# Patient Record
Sex: FEMALE | Race: BLACK OR AFRICAN AMERICAN | NOT HISPANIC OR LATINO | URBAN - METROPOLITAN AREA
[De-identification: names, ages, dates, MRNs, and addresses within clinical notes are randomized per-mention and may not be internally consistent; named-entity substitution may affect disease eponyms.]

---

## 2020-01-12 ENCOUNTER — EMERGENCY (EMERGENCY)
Age: 20
LOS: 1 days | Discharge: HOME | End: 2020-01-12
Admitting: EMERGENCY MEDICINE
Payer: MEDICAID

## 2020-01-12 VITALS
RESPIRATION RATE: 24 BRPM | HEART RATE: 100 BPM | DIASTOLIC BLOOD PRESSURE: 76 MMHG | OXYGEN SATURATION: 100 % | TEMPERATURE: 99 F | SYSTOLIC BLOOD PRESSURE: 121 MMHG

## 2020-01-12 PROCEDURE — 99284 EMERGENCY DEPT VISIT MOD MDM: CPT

## 2020-01-12 RX ORDER — ACETAMINOPHEN 500 MG
650 TABLET ORAL ONCE
Refills: 0 | Status: COMPLETED | OUTPATIENT
Start: 2020-01-12 | End: 2020-01-12

## 2020-01-12 RX ADMIN — Medication 650 MILLIGRAM(S): at 19:43

## 2020-01-12 RX ADMIN — Medication 650 MILLIGRAM(S): at 20:35
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
## 2020-01-12 NOTE — ED PEDIATRIC NURSE REASSESSMENT NOTE - NS ED NURSE REASSESS COMMENT FT2
used #[id number].  Pt and family updated on plan of care and being moved to adult ED.  Nurse report given, attending report given.

## 2020-01-12 NOTE — ED PEDIATRIC TRIAGE NOTE - PAIN: BODY LOCATION
head/face/third finger/forehead/neck/generalized/scalp/throat head/forehead/neck/third finger/face/scalp/generalized/throat
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
## 2020-01-12 NOTE — ED STATDOCS - PROGRESS NOTE DETAILS
Report given to [facility] Emergency Department attending Dr. Rao. - Betty Nick MD (Attending)

## 2020-01-13 VITALS
TEMPERATURE: 98 F | RESPIRATION RATE: 17 BRPM | DIASTOLIC BLOOD PRESSURE: 60 MMHG | HEART RATE: 85 BPM | SYSTOLIC BLOOD PRESSURE: 107 MMHG | OXYGEN SATURATION: 99 %

## 2020-01-13 PROCEDURE — 72125 CT NECK SPINE W/O DYE: CPT | Mod: 26

## 2020-01-13 PROCEDURE — 70450 CT HEAD/BRAIN W/O DYE: CPT | Mod: 26

## 2020-01-13 PROCEDURE — 70486 CT MAXILLOFACIAL W/O DYE: CPT | Mod: 26

## 2020-01-13 PROCEDURE — 73130 X-RAY EXAM OF HAND: CPT | Mod: 26,RT

## 2020-01-13 PROCEDURE — 71046 X-RAY EXAM CHEST 2 VIEWS: CPT | Mod: 26

## 2020-01-13 RX ORDER — KETOROLAC TROMETHAMINE 30 MG/ML
30 SYRINGE (ML) INJECTION ONCE
Refills: 0 | Status: DISCONTINUED | OUTPATIENT
Start: 2020-01-13 | End: 2020-01-13

## 2020-01-13 RX ORDER — TETANUS TOXOID, REDUCED DIPHTHERIA TOXOID AND ACELLULAR PERTUSSIS VACCINE, ADSORBED 5; 2.5; 8; 8; 2.5 [IU]/.5ML; [IU]/.5ML; UG/.5ML; UG/.5ML; UG/.5ML
0.5 SUSPENSION INTRAMUSCULAR ONCE
Refills: 0 | Status: COMPLETED | OUTPATIENT
Start: 2020-01-13 | End: 2020-01-13

## 2020-01-13 RX ADMIN — Medication 30 MILLIGRAM(S): at 03:30

## 2020-01-13 RX ADMIN — Medication 30 MILLIGRAM(S): at 03:27

## 2020-01-13 RX ADMIN — TETANUS TOXOID, REDUCED DIPHTHERIA TOXOID AND ACELLULAR PERTUSSIS VACCINE, ADSORBED 0.5 MILLILITER(S): 5; 2.5; 8; 8; 2.5 SUSPENSION INTRAMUSCULAR at 01:24

## 2020-01-13 NOTE — ED PROVIDER NOTE - CHPI ED SYMPTOMS NEG
vomiting. Pt denies LOC, weakness, numbness and tingling, shortness of breath, abd pain, visual changes no change in level of consciousness/no blurred vision/no vomiting/no loss of consciousness
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
## 2020-01-13 NOTE — ED PROVIDER NOTE - CHPI ED SYMPTOMS POS
NAUSEA/HEADACHE/facial pain, facial abrasions, upper anterior chest wall pain, R 3rd digit pain HEADACHE/PAIN/ABRASION
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
## 2020-01-13 NOTE — ED PROVIDER NOTE - PATIENT PORTAL LINK FT
You can access the FollowMyHealth Patient Portal offered by [facility] by registering at the following website: [email or web address]. By joining [facility]’s FollowMyHealth portal, you will also be able to view your health information using other applications (apps) compatible with our system.
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
## 2020-05-15 NOTE — ED PROVIDER NOTE - ENMT, MLM
Statement Selected Airway patent, Nasal mucosa clear. Mouth with normal mucosa. Throat has no vesicles, no oropharyngeal exudates and uvula is midline.

## 2022-05-20 NOTE — ED PEDIATRIC TRIAGE NOTE - BP NONINVASIVE SYSTOLIC (MM HG)
[FreeTextEntry1] : apply cast and ice motrin for pain and elevation no PE for one week\par no need for xray now  
121